# Patient Record
Sex: FEMALE | URBAN - METROPOLITAN AREA
[De-identification: names, ages, dates, MRNs, and addresses within clinical notes are randomized per-mention and may not be internally consistent; named-entity substitution may affect disease eponyms.]

---

## 2019-10-01 ENCOUNTER — OFFICE VISIT (OUTPATIENT)
Dept: NEUROLOGY | Age: 79
End: 2019-10-01
Payer: COMMERCIAL

## 2019-10-01 VITALS
HEIGHT: 60 IN | HEART RATE: 81 BPM | BODY MASS INDEX: 26.58 KG/M2 | DIASTOLIC BLOOD PRESSURE: 68 MMHG | WEIGHT: 135.4 LBS | SYSTOLIC BLOOD PRESSURE: 121 MMHG

## 2019-10-01 DIAGNOSIS — G35 MULTIPLE SCLEROSIS (HCC): Primary | ICD-10-CM

## 2019-10-01 PROCEDURE — 99204 OFFICE O/P NEW MOD 45 MIN: CPT | Performed by: NURSE PRACTITIONER

## 2019-10-01 RX ORDER — DONEPEZIL HYDROCHLORIDE 10 MG/1
10 TABLET, FILM COATED ORAL NIGHTLY
COMMUNITY

## 2019-10-01 RX ORDER — BENAZEPRIL HYDROCHLORIDE 40 MG/1
40 TABLET, FILM COATED ORAL DAILY
COMMUNITY

## 2019-10-01 RX ORDER — HYDROCHLOROTHIAZIDE 25 MG/1
25 TABLET ORAL DAILY
COMMUNITY

## 2019-10-14 ENCOUNTER — TELEPHONE (OUTPATIENT)
Dept: NEUROLOGY | Age: 79
End: 2019-10-14

## 2019-10-22 DIAGNOSIS — G35 MULTIPLE SCLEROSIS (HCC): ICD-10-CM

## 2019-10-25 ENCOUNTER — TELEPHONE (OUTPATIENT)
Dept: NEUROLOGY | Age: 79
End: 2019-10-25

## 2020-01-23 ENCOUNTER — OFFICE VISIT (OUTPATIENT)
Dept: NEUROLOGY | Age: 80
End: 2020-01-23
Payer: COMMERCIAL

## 2020-01-23 VITALS
SYSTOLIC BLOOD PRESSURE: 101 MMHG | HEIGHT: 60 IN | WEIGHT: 135 LBS | DIASTOLIC BLOOD PRESSURE: 62 MMHG | BODY MASS INDEX: 26.5 KG/M2 | HEART RATE: 79 BPM

## 2020-01-23 PROBLEM — G35 SECONDARY PROGRESSIVE MULTIPLE SCLEROSIS (HCC): Status: ACTIVE | Noted: 2020-01-23

## 2020-01-23 PROBLEM — F02.80 DEMENTIA ASSOCIATED WITH OTHER UNDERLYING DISEASE WITHOUT BEHAVIORAL DISTURBANCE (HCC): Status: ACTIVE | Noted: 2020-01-23

## 2020-01-23 PROBLEM — M21.371 RIGHT FOOT DROP: Status: ACTIVE | Noted: 2020-01-23

## 2020-01-23 PROCEDURE — 99214 OFFICE O/P EST MOD 30 MIN: CPT | Performed by: NURSE PRACTITIONER

## 2020-01-23 NOTE — PROGRESS NOTES
[facility]            [address]          [place], [address] [facility]          Dept: [phone]          Dept Fax: [phone]        MD Alvina Devine MD Ahmed B. Vonna Jungling, MD Curlee Kelp, MD Lucyann Pollen, MD Tri Watts, CNP            1/23/2020      HISTORY OF PRESENT ILLNESS:       I had the pleasure of seeing Benjie Kemp, who returns for continuing neurologic care. Patient is an 79-year-old woman who was seen last on October 1, 2019 for evaluation of memory difficulties. The patient has a history of multiple sclerosis. It was diagnosed over 20 years ago and the patient had little information or medical records available for review. She had previously been seen by  Dr. Fallon Dorantes who also had limited information about her previous diagnosis. She stated that originally, Lyme's disease was suspected, however she was evaluated by the [facility] who thought the diagnosis was more consistent with multiple sclerosis. They were unclear as to whether or not she had ever received steroids or had been on any disease modifying therapy. Her daughter had accompanied her at her last visit and stated that she thought she was on an oral disease modifying therapy at one point, but it caused side effects. Her last imaging showed bilateral T2/flair hyperintensities. A recent MRI showed a progression of the T2 hyperintensities, however it was compared to an MRI done in 2009. The patient had been suffering from memory loss, however it was uncertain if this was secondary to an Alzheimer's dementia versus multiple sclerosis. An MRI in 2016 showed generalized atrophy and a moderate degree of high intensity lesions on T2/flair images.   Laboratory studies indicated a normal vitamin D, B21, folic acid, methylmalonic acid, ACE, cryptococcal antigen, thyroid functions, CBC, and methylmalonic acid, ACE, cryptococcal antigen, thyroid functions, CBC, and syphilis antibody. In the past, she had a normal BA ER and a prolonged visual evoked potential.               PAST MEDICAL HISTORY:   History reviewed. No pertinent past medical history. PAST SURGICAL HISTORY:   History reviewed. No pertinent surgical history. SOCIAL HISTORY:     Social History     Socioeconomic History    Marital status:       Spouse name: Not on file    Number of children: Not on file    Years of education: Not on file    Highest education level: Not on file   Occupational History    Not on file   Social Needs    Financial resource strain: Not on file    Food insecurity:     Worry: Not on file     Inability: Not on file    Transportation needs:     Medical: Not on file     Non-medical: Not on file   Tobacco Use    Smoking status: Never Smoker    Smokeless tobacco: Never Used   Substance and Sexual Activity    Alcohol use: Not Currently    Drug use: Never    Sexual activity: Not on file   Lifestyle    Physical activity:     Days per week: Not on file     Minutes per session: Not on file    Stress: Not on file   Relationships    Social connections:     Talks on phone: Not on file     Gets together: Not on file     Attends Hoahaoism service: Not on file     Active member of club or organization: Not on file     Attends meetings of clubs or organizations: Not on file     Relationship status: Not on file    Intimate partner violence:     Fear of current or ex partner: Not on file     Emotionally abused: Not on file     Physically abused: Not on file     Forced sexual activity: Not on file   Other Topics Concern    Not on file   Social History Narrative    Not on file       CURRENT MEDICATIONS:     Current Outpatient Medications   Medication Sig Dispense Refill    hydrochlorothiazide (HYDRODIURIL) 25 MG tablet Take 25 mg by mouth daily      benazepril (LOTENSIN) 40 MG tablet Take 40 mg by mouth daily      donepezil (ARICEPT) 10 MG tablet Take 10 mg by mouth nightly       No current facility-administered medications for this visit.          ALLERGIES:     Allergies   Allergen Reactions    Aspirin                                  REVIEW OF SYSTEMS    CONSTITUTIONAL Weight: absent, Appetite: absent, Fatigue: absent      HEENT Ears: normal, Visual disturbance: absent   RESPIRATORY Shortness of breath: present, Cough: absent   CARDIOVASCULAR Chest pain: absent, Leg swelling :absent      GI Constipation: absent, Diarrhea: absent, Swallowing change: absent       Urinary frequency: absent, Urinary urgency: absent,   MUSCULOSKELETAL Neck pain: absent, Back pain: absent, Stiffness: absent, Muscle pain: absent, Joint pain: absent Restless legs: absent   DERMATOLOGIC Hair loss: absent, Skin changes: absent   NEUROLOGIC Memory loss: present, Confusion: absent, Seizures: absent Trouble walking or imbalance: present, Dizziness: absent, Weakness: absent, Numbness: absent Tremor: absent, Spasm: absent, Speech difficulty: absent, Headache: absent, Light sensitivity: absent   PSYCHIATRIC Anxiety: absent, Hallucination: absent, Mood disorder: absent   HEMATOLOGIC Abnormal bleeding: absent, Anemia: absent,            PHYSICAL EXAMINATION:       Vitals:    01/23/20 1526   BP: 101/62   Pulse: 79                                              .                                                                                                    General Appearance:  Alert, cooperative, no signs of distress, appears stated age   Head:  Normocephalic, no signs of trauma   Eyes:  Conjunctiva/corneas clear;  eyelids intact   Ears:  Normal external ear and canals   Nose: Nares normal, mucosa normal, no drainage    Throat: Lips and tongue normal; teeth normal;  gums normal   Neck: Supple, intact flexion, extension and rotation;   trachea midline;  no adenopathy;   thyroid: not enlarged;   no carotid pulse abnormality   Back:   Symmetric, no curvature, ROM adequate   Lungs:   Respirations unlabored   Heart:  Regular rate and rhythm           Extremities: Extremities normal, no cyanosis, no edema   Pulses: Symmetric over head and neck   Skin: Skin color, texture normal, no rashes, no lesions                                     NEUROLOGIC EXAMINATION    Neurologic Exam  Mental status    Alert and oriented x 3; intact memory with no confusion, speech or language problems; no hallucinations or delusions  Fund of information appropriate for level of education    Cranial nerves    II - visual fields intact to confrontation bilaterally  III, IV, VI - extra-ocular muscles full: no pupillary defect; no LIZETH, no nystagmus, no ptosis   V - normal facial sensation                                                               VII - normal facial symmetry                                                             VIII - intact hearing                                                                             IX, X - symmetrical palate                                                                  XI - symmetrical shoulder shrug                                                       XII - tongue midline without atrophy or fasciculation      Motor function  Normal muscle bulk and tone; strength 5/5 on all 4 extremities, no pronator drift      Sensory function Intact to light touch, pinprick, vibration, proprioception on all 4 extremities      Cerebellar Intact fine motor movement. No involuntary movements or tremors. No ataxia or dysmetria on finger to nose or heel to shin testing      Reflex function DTR 2+ on bilateral UE and LE, symmetric. Negative Babinski      Gait                   normal base and arm swing                  ASSESSMENT AND PLAN:           In summary, your patient, Kt Levine exhibits the following, with associated plan:    1.  Secondary progressive multiple sclerosis with recent MRI showing atrophy and bilateral periventricular white matter changes which were worse since 2009. 1. Continue to use a cane to ambulate secondary to right leg weakness. 2. Dementia likely secondary to her multiple sclerosis although an Alzheimer's dementia cannot entirely be ruled out. Recent neuropsychologic testing suggested a mild dementia. 1. Continue Aricept 10 mg daily. 2. Reassess with Mini-Mental status exam in 6 months.             Signed: Reinaldo De Jesus, CNP      *Please note that portions of this note were completed with a voice recognition program.  Although every effort was made to insure the accuracy of this automated transcription, some errors in transcription may have occurred, occasionally words and are mis-transcribed